# Patient Record
(demographics unavailable — no encounter records)

---

## 2025-05-20 NOTE — ASSESSMENT
[FreeTextEntry1] : Mr. Villalobos is a 42-year-old man, active smoker, with history of dyslipidemia presenting for preventive cardiac care.  Impression: (1) Suspected sleep apnea (2) Elevated office BP, no known history of HTN (3) Dyslipidemia, on simvastatin 10mg, LDL 86 (4) Active tobacco use  Plan: - Sleep study - TTE - Continue simvastatin - Smoking cessation - Lifestyle modification with emphasis on weight loss  RTC after the above testing

## 2025-05-20 NOTE — HISTORY OF PRESENT ILLNESS
[FreeTextEntry1] : Mr. Villalobos is a 42-year-old man, active smoker, with history of dyslipidemia presenting for preventive cardiac care.  Overall reports being in good health. Smokes half a pack a day and is interested in cutting back. He denies chest pain, dyspnea, palpitations, pre-syncope, syncope, LE swelling, PND, or orthopnea.  Blood pressure is elevated today at 133/91. He does not regularly measure BP.  No family history of premature ASCVD. Several family members have lived into their 90s.  Labs: - A1c 5.6%, TSH 1.9 - , LDL 86, HDL 51  Meds: - Simvastatin 10mg

## 2025-06-19 NOTE — HISTORY OF PRESENT ILLNESS
[TextBox_4] : 42 years old presented for above had home sleep study ordered by his cardiologist which showed moderate SCOOTER with AHI score of 15.2 was referred.  He reports snoring denies any other symptoms excessive daytime sleepiness/tiredness he STOP-BANG was 2 and [place] Sleepiness Scale was 1.  patient gained a lot of weight almost 50 pounds over the last 5 years.  Ex heavy smoker cut down the last 7 years saw pulmonary had spirometry done which was good denies shortness of breath

## 2025-06-19 NOTE — DISCUSSION/SUMMARY
[FreeTextEntry1] : Moderate SCOOTER discussed option with patient including APAP/oral appliance therapy Patient decided to lose weight and repeat sleep study/STOP-BANG/ESS noted Smoker Chest x-ray Weight loss